# Patient Record
Sex: FEMALE | Race: OTHER
[De-identification: names, ages, dates, MRNs, and addresses within clinical notes are randomized per-mention and may not be internally consistent; named-entity substitution may affect disease eponyms.]

---

## 2022-12-25 ENCOUNTER — HOSPITAL ENCOUNTER (EMERGENCY)
Dept: HOSPITAL 97 - ER | Age: 27
Discharge: HOME | End: 2022-12-25
Payer: SELF-PAY

## 2022-12-25 VITALS — TEMPERATURE: 98.6 F | DIASTOLIC BLOOD PRESSURE: 96 MMHG | SYSTOLIC BLOOD PRESSURE: 133 MMHG | OXYGEN SATURATION: 100 %

## 2022-12-25 DIAGNOSIS — J06.9: Primary | ICD-10-CM

## 2022-12-25 DIAGNOSIS — Z20.822: ICD-10-CM

## 2022-12-25 LAB — SARS-COV-2 RNA RESP QL NAA+PROBE: NEGATIVE

## 2022-12-25 PROCEDURE — 81003 URINALYSIS AUTO W/O SCOPE: CPT

## 2022-12-25 PROCEDURE — 93005 ELECTROCARDIOGRAM TRACING: CPT

## 2022-12-25 PROCEDURE — 99284 EMERGENCY DEPT VISIT MOD MDM: CPT

## 2022-12-25 PROCEDURE — 71045 X-RAY EXAM CHEST 1 VIEW: CPT

## 2022-12-25 PROCEDURE — 96372 THER/PROPH/DIAG INJ SC/IM: CPT

## 2022-12-25 PROCEDURE — 0240U: CPT

## 2022-12-25 NOTE — EDPHYS
Physician Documentation                                                                           

 [facility]                                                                 

Name: Maria T Gibbons                                                                               

Age: 27 yrs                                                                                       

Sex: Female                                                                                       

: 1995                                                                                   

MRN: [id number]                                                                                   

Arrival Date: 2022                                                                          

Time: 20:04                                                                                       

Account#: [id number]                                                                            

Bed 17                                                                                            

Private MD:                                                                                       

ED Physician Nigel Fitzgerald                                                                     

HPI:                                                                                              

                                                                                             

20:18 This 27 yrs old Female presents to ER via Unassigned with complaints of Chest Pain,     rt  

      Sneezing, Cough, Headache.                                                                  

20:18 The patient or guardian reports chest pain that is located primarily in the substernal  rt  

      area. The pain does not radiate. Associated signs and symptoms: Pertinent positives:        

      cough. The chest pain is described as aching. Modifying factors: The symptoms are           

      alleviated by nothing. the symptoms are aggravated by cough. Severity of pain: At its       

      worst the pain was mild. Patient presents to the ED with cough, congestion, headache as     

      well as a pleuritic chest pain starting yesterday. She states that NyQuil did not           

      adequately relieve her symptoms today. She denies other acute complaints at this time.      

      Pain is aching nature, nonradiating, no other aggravating or alleviating factors..          

                                                                                                  

OB/GYN:                                                                                           

20:27 LMP 2022                                                                           vc1 

                                                                                                  

Historical:                                                                                       

- Allergies:                                                                                      

20:26 No Known Allergies;                                                                     vc1 

- Home Meds:                                                                                      

20:26 None [Active];                                                                          vc1 

- PMHx:                                                                                           

20:26 None;                                                                                   vc1 

- PSHx:                                                                                           

20:26 None;                                                                                   vc1 

                                                                                                  

- Immunization history:: Client reports receiving the 2nd dose of the Covid vaccine.              

- Social history:: Smoking status: Patient denies any tobacco usage or history of.                

                                                                                                  

                                                                                                  

ROS:                                                                                              

20:18 Constitutional: Negative for fever, chills, and weight loss, Eyes: Negative for injury, rt  

      pain, redness, and discharge, Neck: Negative for injury, pain, and swelling,                

      Abdomen/GI: Negative for abdominal pain, nausea, vomiting, diarrhea, and constipation,      

      MS/Extremity: Negative for injury and deformity, Skin: Negative for injury, rash, and       

      discoloration, Neuro: Negative for headache, weakness, numbness, tingling, and seizure,     

      Psych: Negative for depression, anxiety, suicide ideation, homicidal ideation, and          

      hallucinations.                                                                             

20:18 ENT: Positive for rhinorrhea, sore throat.                                                  

20:18 Cardiovascular: Positive for chest pain, Negative for edema.                                

20:18 Respiratory: Positive for cough, Negative for shortness of breath.                          

                                                                                                  

Exam:                                                                                             

20:18 Constitutional:  This is a well developed, well nourished patient who is awake, alert,  rt  

      and in no acute distress. Head/Face:  Normocephalic, atraumatic. Eyes:  Pupils equal        

      round and reactive to light, extra-ocular motions intact.  Lids and lashes normal.          

      Conjunctiva and sclera are non-icteric and not injected.  Cornea within normal limits.      

      Periorbital areas with no swelling, redness, or edema. ENT:  Nares patent. No nasal         

      discharge, no septal abnormalities noted.  Tympanic membranes are normal and external       

      auditory canals are clear.  Oropharynx with no redness, swelling, or masses, exudates,      

      or evidence of obstruction, uvula midline.  Mucous membranes moist. Chest/axilla:           

      Normal chest wall appearance and motion.  Nontender with no deformity.  No lesions are      

      appreciated. Cardiovascular:  Regular rate and rhythm with a normal S1 and S2.  No          

      gallops, murmurs, or rubs.  Normal PMI, no JVD.  No pulse deficits. Respiratory:  Lungs     

      have equal breath sounds bilaterally, clear to auscultation and percussion.  No rales,      

      rhonchi or wheezes noted.  No increased work of breathing, no retractions or nasal          

      flaring. Abdomen/GI:  Soft, non-tender, with normal bowel sounds.  No distension or         

      tympany.  No guarding or rebound.  No evidence of tenderness throughout. Skin:  Warm,       

      dry with normal turgor.  Normal color with no rashes, no lesions, and no evidence of        

      cellulitis. MS/ Extremity:  Pulses equal, no cyanosis.  Neurovascular intact.  Full,        

      normal range of motion. Neuro:  Awake and alert, GCS 15, oriented to person, place,         

      time, and situation.  Cranial nerves II-XII grossly intact.  Motor strength 5/5 in all      

      extremities.  Sensory grossly intact.  Cerebellar exam normal.  Normal gait. Psych:         

      Awake, alert, with orientation to person, place and time.  Behavior, mood, and affect       

      are within normal limits.                                                                   

20:58 ECG was reviewed by the Attending Physician.                                            rt  

                                                                                                  

Vital Signs:                                                                                      

20:22  / 96; Pulse 80; Resp 18; Temp 98.6; Pulse Ox 100% ; Weight 80.74 kg; Height 5    vc1 

      ft. 7 in. (170.18 cm); Pain 7/10;                                                           

21:30  / 89; Pulse 82; Resp 18; Pulse Ox 99% ; Pain 4/10;                               pf1 

22:30  / 87; Pulse 86; Resp 18; Temp 98.2; Pulse Ox 99% ; Pain 2/10;                    pf1 

20:22 Body Mass Index 27.88 (80.74 kg, 170.18 cm)                                             vc1 

                                                                                                  

MDM:                                                                                              

20:13 Patient medically screened.                                                             rt  

21:51 Differential diagnosis: pleurisy, pneumonia, pneumothorax, pulmonary embolus, thoracic  rt  

      aortic disection. Data reviewed: vital signs, nurses notes, lab test result(s), EKG,        

      radiologic studies.                                                                         

                                                                                                  

                                                                                             

20:18 Order name: COVID-19/FLU A+B; Complete Time: 21:40                                      rt  

                                                                                             

20:53 Order name: Urine Dipstick-Ancillary; Complete Time: 21:40                              EDMS

                                                                                             

20:18 Order name: Chest Single View XRAY; Complete Time: 21:40                                rt  

                                                                                             

20:18 Order name: EKG; Complete Time: 20:19                                                   rt  

                                                                                             

20:18 Order name: EKG - Nurse/Tech; Complete Time: 20:56                                      rt  

                                                                                                  

EC:58 Rate is 71 beats/min. Rhythm is regular, Normal Sinus Rhythm with No ectopy. QRS Axis   rt  

      is Normal. KY interval is normal. QRS interval is normal. QT interval is normal. No Q       

      waves. T waves are Normal. No ST changes noted. Clinical impression: Normal ECG.            

                                                                                                  

Administered Medications:                                                                         

20:55 Drug: Ketorolac 30 mg Route: IM; Site: right deltoid;                                   pf1 

21:50 Follow up: Response: No adverse reaction; Pain is decreased; RASS: Alert and Calm (0)   pf1 

                                                                                                  

                                                                                                  

Disposition Summary:                                                                              

22 21:50                                                                                    

Discharge Ordered                                                                                 

      Location: Home                                                                          rt  

      Problem: new                                                                            rt  

      Symptoms: have improved                                                                 rt  

      Condition: Stable                                                                       rt  

      Diagnosis                                                                                   

        - Acute upper respiratory infection, unspecified                                      rt  

      Followup:                                                                               rt  

        - With: Private Physician                                                                  

        - When: 2 - 3 days                                                                         

        - Reason:                                                                                  

      Discharge Instructions:                                                                     

        - Discharge Summary Sheet                                                             rt  

        - Viral Respiratory Infection                                                         rt  

      Forms:                                                                                      

        - Medication Reconciliation Form                                                      rt  

        - Thank You Letter                                                                    rt  

        - Antibiotic Education                                                                rt  

        - Work release form                                                                   rt  

        - Prescription Opioid Use                                                             rt  

Signatures:                                                                                       

Dispatcher [employer]                           EDMS                                                 

Calcote, Abigail, RN                    RN   vc1                                                  

Nigel Fitzgerald MD MD   rt                                                   

Maria Elena parish, RN                      RN   pf1                                                  

                                                                                                  

Corrections: (The following items were deleted from the chart)                                    

20:26 20:26 Home Meds: Unable to obtain; vc1                                                  vc1 

                                                                                                  

**************************************************************************************************

## 2022-12-25 NOTE — ER
Nurse's Notes                                                                                     

 [facility]                                                                 

Name: Maria T Gibbons                                                                               

Age: 27 yrs                                                                                       

Sex: Female                                                                                       

: 1995                                                                                   

MRN: [id number]                                                                                   

Arrival Date: 2022                                                                          

Time: 20:04                                                                                       

Account#: [id number]                                                                            

Bed 17                                                                                            

Private MD:                                                                                       

Diagnosis: Acute upper respiratory infection, unspecified                                         

                                                                                                  

Presentation:                                                                                     

                                                                                             

20:22 Chief complaint: Patient states: "For the last 2 nights I have had a cough, sneezing,   vc1 

      and a headache. Today I started having chest pain when I take deep breaths.".               

      Coronavirus screen: Vaccine status: Patient reports receiving the 2nd dose of the covid     

      vaccine. Pfizer cough unrelated to allergies, headache, Client presents with at least       

      one sign or symptom that may indicate coronavirus-19. Standard/surgical mask placed on      

      the client. Provider contacted for isolation considerations. At this time, the client       

      does not indicate any symptoms associated with coronavirus-19. Ebola Screen: No             

      symptoms or risks identified at this time. Initial Sepsis Screen: Does the patient meet     

      any 2 criteria? No. Patient's initial sepsis screen is negative. Does the patient have      

      a suspected source of infection? Yes: Productive cough/pneumonia. Risk Assessment: Do       

      you want to hurt yourself or someone else? Patient reports no desire to harm self or        

      others. Onset of symptoms was 2022.                                            

20:22 Method Of Arrival: Ambulatory                                                           vc1 

20:22 Acuity: CHEO 3                                                                           vc1 

                                                                                                  

Triage Assessment:                                                                                

20:24 General: Appears in no apparent distress. uncomfortable, ill, Behavior is calm,         vc1 

      cooperative, appropriate for age. Pain: Complains of pain in chest Pain does not            

      radiate. Pain currently is 7 out of 10 on a pain scale. Aggravated by deep breaths.         

      EENT: No deficits noted. Neuro: Reports headache. Cardiovascular: Reports chest pain,       

      Chest pain is described as mild, quality is sharp, episodes are intermittent is             

      aggravated by breathing. Respiratory: Reports cough that is non-productive, pain with       

      respiration Airway is patent Respiratory effort is even, unlabored, Respiratory pattern     

      is regular, symmetrical. GI: No deficits noted. No signs and/or symptoms were reported      

      involving the gastrointestinal system. : No deficits noted. No signs and/or symptoms      

      were reported regarding the genitourinary system. Derm: No deficits noted. No signs         

      and/or symptoms reported regarding the dermatologic system. Musculoskeletal: No             

      deficits noted. No signs and/or symptoms reported regarding the musculoskeletal system.     

                                                                                                  

OB/GYN:                                                                                           

20:27 LMP 2022                                                                           vc1 

                                                                                                  

Historical:                                                                                       

- Allergies:                                                                                      

20:26 No Known Allergies;                                                                     vc1 

- Home Meds:                                                                                      

20:26 None [Active];                                                                          vc1 

- PMHx:                                                                                           

20:26 None;                                                                                   vc1 

- PSHx:                                                                                           

20:26 None;                                                                                   vc1 

                                                                                                  

- Immunization history:: Client reports receiving the 2nd dose of the Covid vaccine.              

- Social history:: Smoking status: Patient denies any tobacco usage or history of.                

                                                                                                  

                                                                                                  

Screenin:24 [facility] ED Fall Risk Assessment (Adult) History of falling in the last 3 months,       vc1 

      including since admission No falls in past 3 months (0 pts) Confusion or Disorientation     

      No (0 pts) Intoxicated or Sedated No (0 pts) Impaired Gait No (0 pts) Mobility Assist       

      Device Used No (0 pt) Altered Elimination No (0 pt) Score/Fall Risk Level 0 - 2 = Low       

      Risk Oriented to surroundings, Maintained a safe environment, Educated pt \T\ family on     

      fall prevention, incl call for assistance when getting out of bed. Abuse screen: Denies     

      threats or abuse. Nutritional screening: No deficits noted. Tuberculosis screening: No      

      symptoms or risk factors identified.                                                        

                                                                                                  

Assessment:                                                                                       

20:27 Pain: Pain began suddenly, today.                                                       vc1 

20:30 General: Appears in no apparent distress. comfortable, well groomed, well developed,    pf1 

      Behavior is calm, cooperative, appropriate for age, quiet.                                  

20:30 Pain: Complains of pain in head and chest Pain currently is 7 out of 10 on a pain       pf1 

      scale. Neuro: Level of Consciousness is awake, alert, obeys commands, Oriented to           

      person, place, time, situation, Reports headache frontal area. Cardiovascular: Chest        

      pain began 1 day ago. Respiratory: Airway is patent Respiratory effort is even,             

      unlabored, Breath sounds are clear bilaterally. Respiratory: Reports cough that is          

      non-productive, pain with cough. GI: No deficits noted. No signs and/or symptoms were       

      reported involving the gastrointestinal system. : No deficits noted. No signs and/or      

      symptoms were reported regarding the genitourinary system. EENT: No deficits noted. No      

      signs and/or symptoms were reported regarding the EENT system.                              

                                                                                                  

Vital Signs:                                                                                      

20:22  / 96; Pulse 80; Resp 18; Temp 98.6; Pulse Ox 100% ; Weight 80.74 kg; Height 5    vc1 

      ft. 7 in. (170.18 cm); Pain 7/10;                                                           

21:30  / 89; Pulse 82; Resp 18; Pulse Ox 99% ; Pain 4/10;                               pf1 

22:30  / 87; Pulse 86; Resp 18; Temp 98.2; Pulse Ox 99% ; Pain 2/10;                    pf1 

20:22 Body Mass Index 27.88 (80.74 kg, 170.18 cm)                                             vc1 

                                                                                                  

ED Course:                                                                                        

20:04 Patient arrived in ED.                                                                  ja2 

20:12 Nigel Fitzgerald MD is Attending Physician.                                            rt  

20:14 Maria Elena parish, CHRISTOPHE is Primary Nurse.                                                    pf1 

20:24 Triage completed.                                                                       vc1 

20:26 Arm band placed on right wrist.                                                         vc1 

20:27 Patient has correct armband on for positive identification. Placed in gown. Bed in low  vc1 

      position.                                                                                   

20:27 Patient maintains SpO2 saturation greater than 95% on room air.                         vc1 

20:30 Client placed on continuous cardiac and pulse oximetry monitoring. NIBP monitoring      pf1 

      applied.                                                                                    

20:40 No provider procedures requiring assistance completed.                                  pf1 

21:23 Chest Single View XRAY In Process Unspecified.                                          EDMS

22:30 Patient did not have IV access during this emergency room visit.                        pf1 

                                                                                                  

Administered Medications:                                                                         

20:55 Drug: Ketorolac 30 mg Route: IM; Site: right deltoid;                                   pf1 

21:50 Follow up: Response: No adverse reaction; Pain is decreased; RASS: Alert and Calm (0)   pf1 

                                                                                                  

                                                                                                  

Medication:                                                                                       

20:27 VIS not applicable for this client.                                                     vc1 

                                                                                                  

Outcome:                                                                                          

21:50 Discharge ordered by MD.                                                                rt  

22:30 Patient left the ED.                                                                    vc1 

22:30 Discharged to home ambulatory.                                                          pf1 

22:30 Condition: improved                                                                     pf1 

22:30 Discharge instructions given to patient, Instructed on discharge instructions, follow       

      up and referral plans. Demonstrated understanding of instructions, follow-up care,          

      medications.                                                                                

                                                                                                  

Signatures:                                                                                       

Dispatcher [employer]                           EDMS                                                 

JayYandy                           ja2                                                  

Abigail Mansfield RN                    RN   vc1                                                  

Nigel Fitzgerald MD MD   rt                                                   

Maria Elena parish, CHRISTOPHE                      RN   pf1                                                  

                                                                                                  

Corrections: (The following items were deleted from the chart)                                    

20:26 20:26 Home Meds: Unable to obtain; vc1                                                  vc1 

                                                                                                  

**************************************************************************************************

## 2022-12-25 NOTE — RAD REPORT
EXAM DESCRIPTION:  RAD - Chest Single View - 12/25/2022 9:21 pm

 

CLINICAL HISTORY:  COUGH

Chest pain.

 

COMPARISON:  No comparisons

 

FINDINGS:  Portable technique limits examination quality.

 

The lungs are grossly clear. The heart is normal in size. No displaced fractures.

 

IMPRESSION:  No acute intrathoracic process suspected.

## 2022-12-26 NOTE — EKG
Test Date:    2022-12-25               Test Time:    20:52:43

Technician:   MICHAEL                                     

                                                     

MEASUREMENT RESULTS:                                       

Intervals:                                           

Rate:         71                                     

FL:           168                                    

QRSD:         88                                     

QT:           388                                    

QTc:          421                                    

Axis:                                                

P:            17                                     

FL:           168                                    

QRS:          68                                     

T:            35                                     

                                                     

INTERPRETIVE STATEMENTS:                                       

                                                     

Normal sinus rhythm

Normal ECG

No previous ECG available for comparison



Electronically Signed On 12-26-22 16:03:35 CST by Jose C Gilman